# Patient Record
Sex: MALE | Race: BLACK OR AFRICAN AMERICAN | HISPANIC OR LATINO | Employment: PART TIME | URBAN - METROPOLITAN AREA
[De-identification: names, ages, dates, MRNs, and addresses within clinical notes are randomized per-mention and may not be internally consistent; named-entity substitution may affect disease eponyms.]

---

## 2017-09-27 ENCOUNTER — OFFICE VISIT (OUTPATIENT)
Dept: URGENT CARE | Facility: CLINIC | Age: 22
End: 2017-09-27
Payer: COMMERCIAL

## 2017-09-27 PROCEDURE — 86580 TB INTRADERMAL TEST: CPT

## 2017-09-29 ENCOUNTER — TRANSCRIBE ORDERS (OUTPATIENT)
Dept: URGENT CARE | Facility: CLINIC | Age: 22
End: 2017-09-29

## 2017-09-29 ENCOUNTER — APPOINTMENT (OUTPATIENT)
Dept: RADIOLOGY | Facility: CLINIC | Age: 22
End: 2017-09-29
Payer: COMMERCIAL

## 2017-09-29 DIAGNOSIS — R76.11 POSITIVE TB TEST: ICD-10-CM

## 2017-09-29 DIAGNOSIS — R76.11 POSITIVE TB TEST: Primary | ICD-10-CM

## 2017-09-29 PROCEDURE — 71020 HB CHEST X-RAY 2VW FRONTAL&LATL: CPT

## 2017-09-29 NOTE — PROGRESS NOTES
Assessment  1  Encounter for physical examination related to employment (V70 5) (Z02 1)    Plan  Tuberculosis screening    · Tubersol 5 UNIT/0 1ML Intradermal Solution    Discussion/Summary  Discussion Summary:   Follow up with PCP as needed  Chief Complaint  Chief Complaint Free Text Note Form: Pt presents for work physical and ppd      History of Present Illness  HPI: Here for work physical  Offers no complaints  Hospital Based Practices Required Assessment:   Pain Assessment   the patient states they do not have pain  (on a scale of 0 to 10, the patient rates the pain at 0 )   Abuse And Domestic Violence Screen    Yes, the patient is safe at home -The patient states no one is hurting them  Depression And Suicide Screen  No, the patient has not had thoughts of hurting themself  No, the patient has not felt depressed in the past 7 days  Review of Systems  Focused-Male:   Constitutional: no fever-and-no chills  ENT: no earache,-no sore throat,-no hearing loss,-no nasal discharge-and-no hoarseness  Cardiovascular: no chest pain-and-no palpitations  Respiratory: no shortness of breath,-no cough-and-no wheezing  Gastrointestinal: no abdominal pain,-no nausea,-no vomiting,-no constipation-and-no diarrhea  Musculoskeletal: no arthralgias-and-no myalgias  Integumentary: no rashes  Neurological: no headache,-no numbness,-no tingling-and-no dizziness  ROS Reviewed:   ROS reviewed  Active Problems  1  Obesity (278 00) (E66 9)   2  Pre-employment examination (V70 5) (Z02 1)   3  Pre-employment examination (V70 5) (Z02 1)   4  Seasonal allergies (477 9) (J30 2)    Past Medical History  1  No pertinent past medical history  Active Problems And Past Medical History Reviewed: The active problems and past medical history were reviewed and updated today  Family History  Mother    1  No pertinent family history  Family History Reviewed: The family history was reviewed and updated today  Social History   · Never a smoker   · No alcohol use   · No drug use  Social History Reviewed: The social history was reviewed and updated today  Surgical History  1  Denied: History Of Prior Surgery   2  Denied: History of Recent Surgery  Surgical History Reviewed: The surgical history was reviewed and updated today  Current Meds   1  No Reported Medications Recorded  Medication List Reviewed: The medication list was reviewed and updated today  Allergies  1  No Known Drug Allergies    Vitals  Signs   Recorded: [id number] 01:18PM   Temperature: 98 2 F  Heart Rate: 82  Respiration: 18  Blood Pressure: 132 mm Hg  Blood Pressure: 80 mm Hg  Height: 5 ft 9 in  Weight: 261 lb   BMI Calculated: 38 54 kg/m2  BSA Calculated: 2 31 m2  O2 Saturation: 98  Pain Scale: 0    Physical Exam    Constitutional   General appearance: No acute distress, well appearing and well nourished  Eyes   Conjunctiva and lids: No swelling, erythema, or discharge  Pupils and irises: Equal, round and reactive to light  Ears, Nose, Mouth, and Throat   External inspection of ears and nose: Normal     Otoscopic examination: Tympanic membrance translucent with normal light reflex  Canals patent without erythema  Nasal mucosa, septum, and turbinates: Normal without edema or erythema  Oropharynx: Normal with no erythema, edema, exudate or lesions  Pulmonary   Respiratory effort: No increased work of breathing or signs of respiratory distress  Cardiovascular   Auscultation of heart: Normal rate and rhythm, normal S1 and S2, without murmurs  Abdomen   Abdomen: Non-tender, no masses  Lymphatic   Palpation of lymph nodes in neck: No lymphadenopathy  Musculoskeletal   Gait and station: Normal     Inspection/palpation of joints, bones, and muscles: Normal     Skin   Skin and subcutaneous tissue: Normal without rashes or lesions      Psychiatric   Orientation to person, place and time: Normal     Mood and affect: Normal        Signatures   Electronically signed by : WILLI Leos; Sep 27 2017  1:32PM EST                       (Author)    Electronically signed by : JOSIAS Garcia ; Sep 28 2017  7:53PM EST                       (Co-author)